# Patient Record
Sex: FEMALE | Race: WHITE | Employment: FULL TIME | URBAN - METROPOLITAN AREA
[De-identification: names, ages, dates, MRNs, and addresses within clinical notes are randomized per-mention and may not be internally consistent; named-entity substitution may affect disease eponyms.]

---

## 2017-02-04 DIAGNOSIS — E04.2 MULTINODULAR NON-TOXIC GOITER: Primary | ICD-10-CM

## 2017-02-13 ENCOUNTER — HOSPITAL ENCOUNTER (OUTPATIENT)
Dept: ULTRASOUND IMAGING | Age: 42
Discharge: HOME | End: 2017-02-13
Attending: INTERNAL MEDICINE
Payer: COMMERCIAL

## 2017-02-13 DIAGNOSIS — E04.2 MULTINODULAR NON-TOXIC GOITER: ICD-10-CM

## 2017-02-13 PROCEDURE — 76536 US EXAM OF HEAD AND NECK: CPT

## 2017-03-10 ENCOUNTER — OFFICE VISIT (OUTPATIENT)
Dept: ENDOCRINOLOGY | Age: 42
End: 2017-03-10

## 2017-03-10 VITALS
WEIGHT: 128.2 LBS | DIASTOLIC BLOOD PRESSURE: 63 MMHG | SYSTOLIC BLOOD PRESSURE: 113 MMHG | HEART RATE: 71 BPM | BODY MASS INDEX: 23.59 KG/M2 | HEIGHT: 62 IN

## 2017-03-10 DIAGNOSIS — R53.82 CHRONIC FATIGUE: ICD-10-CM

## 2017-03-10 DIAGNOSIS — E04.2 MULTINODULAR NON-TOXIC GOITER: Primary | ICD-10-CM

## 2017-03-10 RX ORDER — BUDESONIDE 0.25 MG/2ML
INHALANT ORAL
COMMUNITY
End: 2018-03-01

## 2017-03-10 RX ORDER — DROSPIRENONE AND ETHINYL ESTRADIOL 0.02-3(28)
KIT ORAL DAILY
COMMUNITY

## 2017-03-10 RX ORDER — SERTRALINE HYDROCHLORIDE 50 MG/1
TABLET, FILM COATED ORAL DAILY
COMMUNITY
End: 2018-03-01

## 2017-03-10 NOTE — MR AVS SNAPSHOT
Visit Information Date & Time Provider Department Dept. Phone Encounter #  
 3/10/2017  8:30 AM Ramona Cobian, [address] [place] [address] Diabetes and Endocrinology [phone] [id number] Follow-up Instructions Return in about 1 year (around 3/10/2018). Upcoming Health Maintenance Date Due DTaP/Tdap/Td series (1 - Tdap) 2/2/1995 PAP AKA CERVICAL CYTOLOGY 2/2/1995 INFLUENZA AGE 9 TO ADULT 8/1/2016 Allergies as of 3/10/2017  Review Complete On: 3/10/2017 By: Ramona Cobian MD  
  
 Severity Noted Reaction Type Reactions Flagyl [Metronidazole]  03/04/2016    Other (comments) Macrobid [Nitrofurantoin Monohyd/m-cryst]  11/15/2012    Rash Pcn [Penicillins]  11/15/2012    Rash  
 Sulfa Dyne  11/15/2012    Rash Current Immunizations  Never Reviewed Name Date Influenza Vaccine Split 11/15/2012 Not reviewed this visit You Were Diagnosed With   
  
 Codes Comments Multinodular non-toxic goiter    -  Primary ICD-10-CM: E04.2 ICD-9-CM: 246. 1 Vitals BP Pulse Height(growth percentile) Weight(growth percentile) BMI OB Status 113/63 (BP 1 Location: Left arm, BP Patient Position: Sitting) 71 5' 2\" (1.575 m) 128 lb 3.2 oz (58.2 kg) 23.45 kg/m2 Having regular periods Smoking Status Never Smoker Vitals History BMI and BSA Data Body Mass Index Body Surface Area  
 23.45 kg/m 2 1.6 m 2 Preferred Pharmacy Pharmacy Name Phone [facility] - [address] [place] [address] [phone] Your Updated Medication List  
  
   
This list is accurate as of: 3/10/17  9:05 AM.  Always use your most recent med list.  
  
  
  
  
 budesonide 0.25 mg/2 mL Nbsp Commonly known as:  PULMICORT Uses as needed with the kareen pot  
  
 multivitamin tablet Commonly known as:  ONE A DAY Take 1 Tab by mouth daily.   
  
 PROBIOTIC PO  
 Take  by mouth. Enzymatic therapy pearls    Indications: VAGINAL IRRITATION  
  
 sertraline 50 mg tablet Commonly known as:  ZOLOFT Take  by mouth daily. JARET (28) 3-0.02 mg Tab Generic drug:  drospirenone-ethinyl estradiol Take  by mouth daily. We Performed the Following [place] COLLECTION VENOUS BLOOD,VENIPUNCTURE F6650970 CPT(R)] [place] HANDLG&/OR CONVEY OF SPEC FOR TR OFFICE TO LAB [17396 CPT(R)] TSH 3RD GENERATION [66798 CPT(R)] Follow-up Instructions Return in about 1 year (around 3/10/2018). Patient Instructions 1) Your thyroid ultrasound showed your nodules were stable to slightly smaller than one year ago. At this point, we won't need to continue following with ultrasound unless you develop any of the symptoms below:  
- difficulty swallowing 
- hoarseness 
- difficulty breathing, especially when lying down 2) I will repeat your TSH (thyroid test) and send you a message either via [employer] if you sign up or via letter. 3) I will mail you a lab slip about 3-4 weeks before your next visit to have your labs repeated 3-4 days prior to your next visit. 4) You can try Dr. Jazmine Gutierrez or Dr. Rea Calixto or Dr. Laura Jeffery or Dr. Fide Ribera at [facility] for primary care. [phone]. They are in Medical office building IV ([facility] building) on the 3rd floor across from the ER. Introducing [address]! [facility] introduces [email or web address] patient portal. Now you can access parts of your medical record, email your doctor's office, and request medication refills online. 1. In your internet browser, go to [email or web address] 2. Click on the First Time User? Click Here link in the Sign In box. You will see the New Member Sign Up page. 3. Enter your [email or web address] Access Code exactly as it appears below. You will not need to use this code after youve completed the sign-up process.  If you do not sign up before the expiration date, you must request a new code. · "[employer] Access Code: [email or web address] Expires: 5/7/2017  2:38 PM 
 
4. Enter the last four digits of your Social Security Number (xxxx) and Date of Birth (mm/dd/yyyy) as indicated and click Submit. You will be taken to the next sign-up page. 5. Create a "[employer] ID. This will be your "[employer] login ID and cannot be changed, so think of one that is secure and easy to remember. 6. Create a "[employer] password. You can change your password at any time. 7. Enter your Password Reset Question and Answer. This can be used at a later time if you forget your password. 8. Enter your e-mail address. You will receive e-mail notification when new information is available in [address]. 9. Click Sign Up. You can now view and download portions of your medical record. 10. Click the Download Summary menu link to download a portable copy of your medical information. If you have questions, please visit the Frequently Asked Questions section of the "[employer] website. Remember, "[employer] is NOT to be used for urgent needs. For medical emergencies, dial 911. Now available from your iPhone and Android! Please provide this summary of care documentation to your next provider. Your primary care clinician is listed as Karlie Woods. If you have any questions after today's visit, please call [phone].

## 2017-03-10 NOTE — PROGRESS NOTES
Chief Complaint   Patient presents with    Thyroid Problem     pcp and pharmacy confirmed     History of Present Illness: Shira Jiang is a 36 y.o. female here for follow up of thyroid. Weight up 9 lbs since last visit in 3/16. No dysphagia, dyspnea when supine, or hoarseness. Hasn't noticed any change in the size of her neck. Sometimes notices that the nodules are there and rarely will ache but nothing persistent. Bowels are regular. Periods are regular. Tends to stay cold but unchanged. No hair loss or brittle nails. Does tend to be tired but unchanged. Wonders if there is anything else that could be the cause of her fatigue aside from stress as a CPA and not always getting enough sleep though she tries to get at least 7 hours a night. Current Outpatient Prescriptions   Medication Sig    drospirenone-ethinyl estradiol (JARET, 28,) 3-0.02 mg tab Take  by mouth daily.  sertraline (ZOLOFT) 50 mg tablet Take  by mouth daily.  budesonide (PULMICORT) 0.25 mg/2 mL nbs Uses as needed with the kareen pot    LACTOBACILLUS RHAMNOSUS GG (PROBIOTIC PO) Take  by mouth. Enzymatic therapy pearls      Indications: VAGINAL IRRITATION    multivitamin (ONE A DAY) tablet Take 1 Tab by mouth daily. No current facility-administered medications for this visit. Allergies   Allergen Reactions    Flagyl [Metronidazole] Other (comments)    Macrobid [Nitrofurantoin Monohyd/M-Cryst] Rash    Pcn [Penicillins] Rash    Sulfa Dyne Rash     Review of Systems:  - Cardiovascular: no chest pain  - Neurological: no tremors  - Integumentary: skin is normal    Physical Examination:  Blood pressure 113/63, pulse 71, height 5' 2\" (1.575 m), weight 128 lb 3.2 oz (58.2 kg).   - General: pleasant, no distress, good eye contact   - Neck: (+) nodular goiter   - Cardiovascular: regular, normal rate, nl s1 and s2, no m/r/g   - Respiratory: clear to auscultation bilaterally   - Integumentary: skin is normal, no edema  - Neurological: reflexes 2+ at biceps, no tremors  - Psychiatric: normal mood and affect    Data Reviewed:   EXAM: US THYROID/PARATHYROID/SOFT TISS      INDICATION: Follow-up thyroid nodules.     COMPARISON: Ultrasound 1/8/2016.     TECHNIQUE: Real-time sonography of the thyroid gland was performed with a high  frequency linear transducer. Multiple static images were obtained.     FINDINGS:  The thyroid again is heterogeneous with multiple nodules but unchanged in size      The right lobe measures 4.7 x 1.6 x 1.4 cm, previously 5.1 x 1.5 x 1.3 cm and  the left lobe measures 4.1 x 1.2 x 1.0 cm, previously 4.5 x 1.1 x 1.2 cm. The  isthmus is stable and again measures 2 mm. The largest right-sided nodule  measures 2.7 x 1.0 x 0.8 cm, previously 2.8 x 1.1 x 1.1 cm. The largest  left-sided nodule measures 1.0 x 0.8 x 0.4 cm, previously 1.1 x 0.5 x 0.9 cm.     IMPRESSION  IMPRESSION: No significant change in thyroid nodules or overall thyroid size. Assessment/Plan:     1. Multinodular non-toxic goiter: In Nov 2014, she first noticed that the right side of her neck seemed more prominent and was more aware of a pressure in her neck. Did have occ dysphagia especially when at the gym. No dyspnea when supine or hoarseness. No exposure to neck radiation. Saw Dr. Gin Michel and had a neck ultrasound and then a CT scan and was referred to Dr. Marv Shahid who did biopsies of her 3.6 cm right mid nodule and 1.2 cm left lower lobe nodule and both came back benign. Just saw him for f/u in 2/16 and her TSH was normal at 0.72. Had repeat ultrasound in 2/16 that showed the nodule on the right side was actually smaller at 2.8 cm and in 2/17 her nodules are unchanged. At this point, given she is not having any compressive symptoms, I think it's fine to just monitor the nodules over time on exam and only repeat an ultrasound if she has new symptoms. I will follow her TSH to make sure she remains euthyroid.     - repeat thyroid ultrasound as needed in the future  - check TSH today and prior to next visit      2. Chronic fatigue:   - check cbc w/o diff, cmp, and vitamin D 25-OH level today      Patient Instructions   1) Your thyroid ultrasound showed your nodules were stable to slightly smaller than one year ago. At this point, we won't need to continue following with ultrasound unless you develop any of the symptoms below:   - difficulty swallowing  - hoarseness  - difficulty breathing, especially when lying down    2) I will repeat your TSH (thyroid test) and send you a message either via [employer] if you sign up or via letter. 3) I will mail you a lab slip about 3-4 weeks before your next visit to have your labs repeated 3-4 days prior to your next visit. 4) You can try Dr. J Luis Manuel or Dr. Goyo Liriano or Dr. Lara Carlos or Dr. Praneeth Solis at [facility] for primary care. [phone]. They are in Medical office building IV ([facility] building) on the 3rd floor across from the ER. Follow-up Disposition:  Return in about 1 year (around 3/10/2018). Copy sent to:  Dr. Neelima Nelson via [facility]  Dr. Rebecca Kirk    Lab follow up: 3/11/17    Sent her the following message in a letter:    Resulted Orders   TSH 3RD GENERATION   Result Value Ref Range    TSH 0.802 0.450 - 4.500 uIU/mL    Narrative    Performed at:  [address]  [id number]  : Wenceslao Farmer MD, Phone:  [phone]   VITAMIN D, 25 HYDROXY   Result Value Ref Range    VITAMIN D, 25-HYDROXY 42.6 30.0 - 100.0 ng/mL      Comment:      Vitamin D deficiency has been defined by the [facility] practice guideline as a  level of serum 25-OH vitamin D less than 20 ng/mL (1,2). The Endocrine Society went on to further define vitamin D  insufficiency as a level between 21 and 29 ng/mL (2). 1. IOM ([place] of Medicine). 2010.  Dietary reference     intakes for calcium and D. [facility]: The     [employer]. 2. Vijaya MF, Zac NC, Alberto CARRINGTON, et al.     Evaluation, treatment, and prevention of vitamin D     deficiency: an Endocrine Society clinical practice     guideline. JCEM. 2011 Jul; 96():1911-30. Narrative    Performed at:  [address]  [id number]  : Felicia Alegria MD, Phone:  [phone]   METABOLIC PANEL, COMPREHENSIVE   Result Value Ref Range    Glucose 71 65 - 99 mg/dL    BUN 12 6 - 24 mg/dL    Creatinine 0.79 0.57 - 1.00 mg/dL    GFR est non-AA 92 >59 mL/min/1.73    GFR est  >59 mL/min/1.73    BUN/Creatinine ratio 15 9 - 23    Sodium 141 134 - 144 mmol/L    Potassium 4.5 3.5 - 5.2 mmol/L    Chloride 102 96 - 106 mmol/L    CO2 22 18 - 29 mmol/L    Calcium 9.6 8.7 - 10.2 mg/dL    Protein, total 6.9 6.0 - 8.5 g/dL    Albumin 4.5 3.5 - 5.5 g/dL    GLOBULIN, TOTAL 2.4 1.5 - 4.5 g/dL    A-G Ratio 1.9 1.1 - 2.5      Comment:      **Effective March 13, 2017 the reference interval**    for A/G Ratio will be changing to: Age                Male          Female            0 -  7 days       1.1 - 2.3       1.1 - 2.3            8 - 30 days       1.2 - 2.8       1.2 - 2.8            1 -  6 months     1.3 - 3.6       1.3 - 3.6     7 months -  5 years      1.5 - 2.6       1.5 - 2.6               > 5 years      1.2 - 2.2       1.2 - 2.2      Bilirubin, total <0.2 0.0 - 1.2 mg/dL    Alk.  phosphatase 68 39 - 117 IU/L    AST (SGOT) 15 0 - 40 IU/L    ALT (SGPT) 12 0 - 32 IU/L    Narrative    Performed at:  [address]  [id number]  : Felicia Alegria MD, Phone:  [phone]   CBC W/O DIFF   Result Value Ref Range    WBC 4.6 3.4 - 10.8 x10E3/uL    RBC 4.01 3.77 - 5.28 x10E6/uL    HGB 13.1 11.1 - 15.9 g/dL    HCT 37.8 34.0 - 46.6 %    MCV 94 79 - 97 fL    MCH 32.7 26.6 - 33.0 pg    MCHC 34.7 31.5 - 35.7 g/dL    RDW 12.1 (L) 12.3 - 15.4 %    PLATELET 145 744 - 475 x10E3/uL    Narrative    Performed at:  [address]  [id number]  : Igor Qureshi MD, Phone:  [phone]       I wanted to update you on your recent lab results:    TSH is a thyroid test.  Your level is normal so you don't have any problem with your thyroid function at this time that requires any medication.  -------------------------------------------------------------------------------------------------------------------  Your CBC (complete blood count) was normal so you have no evidence of anemia.  -------------------------------------------------------------------------------------------------------------------  BUN and creatinine are markers of kidney function. Your values are normal.  -------------------------------------------------------------------------------------------------------------------  ALT and AST are markers of liver function. Your values are normal.  -------------------------------------------------------------------------------------------------------------------  Your electrolytes (sodium, potassium, and calcium) are all normal.  Your glucose (blood sugar) is normal.  -------------------------------------------------------------------------------------------------------------------  Your vitamin D level is 42.6 which is normal.  Goal is over 30.    -------------------------------------------------------------------------------------------------------------------  I didn't find any other reason for your fatigue at this time and it may just be from stress. Try to aim for 30 minutes of aerobic activity 5 days a week and aim to get 7-8 hours of sleep every night to see if this helps.

## 2017-03-10 NOTE — PATIENT INSTRUCTIONS
1) Your thyroid ultrasound showed your nodules were stable to slightly smaller than one year ago. At this point, we won't need to continue following with ultrasound unless you develop any of the symptoms below:   - difficulty swallowing  - hoarseness  - difficulty breathing, especially when lying down    2) I will repeat your TSH (thyroid test) and send you a message either via [employer] if you sign up or via letter. 3) I will mail you a lab slip about 3-4 weeks before your next visit to have your labs repeated 3-4 days prior to your next visit. 4) You can try Dr. Ray Bell or Dr. Chito Joshi or Dr. Gail Villeda or Dr. Emiile Salcido at [facility] for primary care. [phone]. They are in Medical office building IV ([facility] building) on the 3rd floor across from the ER.

## 2017-03-11 LAB
25(OH)D3+25(OH)D2 SERPL-MCNC: 42.6 NG/ML (ref 30–100)
ALBUMIN SERPL-MCNC: 4.5 G/DL (ref 3.5–5.5)
ALBUMIN/GLOB SERPL: 1.9 {RATIO} (ref 1.1–2.5)
ALP SERPL-CCNC: 68 IU/L (ref 39–117)
ALT SERPL-CCNC: 12 IU/L (ref 0–32)
AST SERPL-CCNC: 15 IU/L (ref 0–40)
BILIRUB SERPL-MCNC: <0.2 MG/DL (ref 0–1.2)
BUN SERPL-MCNC: 12 MG/DL (ref 6–24)
BUN/CREAT SERPL: 15 (ref 9–23)
CALCIUM SERPL-MCNC: 9.6 MG/DL (ref 8.7–10.2)
CHLORIDE SERPL-SCNC: 102 MMOL/L (ref 96–106)
CO2 SERPL-SCNC: 22 MMOL/L (ref 18–29)
CREAT SERPL-MCNC: 0.79 MG/DL (ref 0.57–1)
ERYTHROCYTE [DISTWIDTH] IN BLOOD BY AUTOMATED COUNT: 12.1 % (ref 12.3–15.4)
GLOBULIN SER CALC-MCNC: 2.4 G/DL (ref 1.5–4.5)
GLUCOSE SERPL-MCNC: 71 MG/DL (ref 65–99)
HCT VFR BLD AUTO: 37.8 % (ref 34–46.6)
HGB BLD-MCNC: 13.1 G/DL (ref 11.1–15.9)
MCH RBC QN AUTO: 32.7 PG (ref 26.6–33)
MCHC RBC AUTO-ENTMCNC: 34.7 G/DL (ref 31.5–35.7)
MCV RBC AUTO: 94 FL (ref 79–97)
PLATELET # BLD AUTO: 276 X10E3/UL (ref 150–379)
POTASSIUM SERPL-SCNC: 4.5 MMOL/L (ref 3.5–5.2)
PROT SERPL-MCNC: 6.9 G/DL (ref 6–8.5)
RBC # BLD AUTO: 4.01 X10E6/UL (ref 3.77–5.28)
SODIUM SERPL-SCNC: 141 MMOL/L (ref 134–144)
TSH SERPL DL<=0.005 MIU/L-ACNC: 0.8 UIU/ML (ref 0.45–4.5)
WBC # BLD AUTO: 4.6 X10E3/UL (ref 3.4–10.8)

## 2018-02-14 ENCOUNTER — TELEPHONE (OUTPATIENT)
Dept: ENDOCRINOLOGY | Age: 43
End: 2018-02-14

## 2018-02-14 DIAGNOSIS — E04.9 GOITER: Primary | ICD-10-CM

## 2018-02-14 NOTE — TELEPHONE ENCOUNTER
----- Message from Frank Babcock MD sent at 2/14/2018  9:26 AM EST -----      ----- Message -----     From: Frank Babcock MD     Sent: 2/14/2018       To: Frank Babcock MD    Mail her a lab slip    ----    completed

## 2018-02-24 LAB — TSH SERPL DL<=0.005 MIU/L-ACNC: 0.83 UIU/ML (ref 0.45–4.5)

## 2018-03-01 ENCOUNTER — OFFICE VISIT (OUTPATIENT)
Dept: ENDOCRINOLOGY | Age: 43
End: 2018-03-01

## 2018-03-01 VITALS
WEIGHT: 127.2 LBS | HEART RATE: 70 BPM | SYSTOLIC BLOOD PRESSURE: 107 MMHG | DIASTOLIC BLOOD PRESSURE: 61 MMHG | HEIGHT: 62 IN | BODY MASS INDEX: 23.41 KG/M2

## 2018-03-01 DIAGNOSIS — E04.2 MULTINODULAR NON-TOXIC GOITER: Primary | ICD-10-CM

## 2018-03-01 NOTE — PATIENT INSTRUCTIONS
1) Your TSH (thyroid test) remains normal so you don't have any need for medication. 2) Watch for the following symptoms and if any develop, please contact me for another appointment:  - difficulty swallowing  - hoarseness  - difficulty breathing, especially when lying down    3) Have your PCP check your TSH once a year and as long as this is normal, there is no need to see me. If it starts going low, then this is concerning for development of hyperthyroidism (fast metabolism). Please watch out for any symptoms of hyperthyroidism that would suggest you need to have labs drawn sooner such as chest pain, heart racing, anxiety, tremors, trouble sleeping, feeling hot all the time, losing weight with out trying, hair loss, or diarrhea. 4) You can try Dr. Bolivar Cardoza or Dr. Kayley Bolanos or Dr. Damon Mcburney at [facility] for primary care. [phone]. They are in Medical office building IV ([facility] on the 3rd floor across from the ER.

## 2018-03-01 NOTE — MR AVS SNAPSHOT
[address] [facility] [address] [address] [phone] Patient: Wandy Carmichael MRN: [id number] CAQ:[birth year] Visit Information Date & Time Provider Department Dept. Phone Encounter #  
 3/1/2018  8:50 AM Sadi Brady MD [place] [facility] [phone] [id number] Follow-up Instructions Return if symptoms worsen or fail to improve. Upcoming Health Maintenance Date Due DTaP/Tdap/Td series (1 - Tdap) 2/2/1995 PAP AKA CERVICAL CYTOLOGY 2/2/1995 Influenza Age 5 to Adult 8/1/2017 Allergies as of 3/1/2018  Review Complete On: 3/1/2018 By: Sadi Brady MD  
  
 Severity Noted Reaction Type Reactions Flagyl [Metronidazole]  03/04/2016    Other (comments) Macrobid [Nitrofurantoin Monohyd/m-cryst]  11/15/2012    Rash Pcn [Penicillins]  11/15/2012    Rash  
 Sulfa Dyne  11/15/2012    Rash Current Immunizations  Never Reviewed Name Date Influenza Vaccine Split 11/15/2012 Not reviewed this visit You Were Diagnosed With   
  
 Codes Comments Multinodular non-toxic goiter    -  Primary ICD-10-CM: E04.2 ICD-9-CM: 725. 1 Vitals BP Pulse Height(growth percentile) Weight(growth percentile) BMI OB Status 107/61 70 5' 2\" (1.575 m) 127 lb 3.2 oz (57.7 kg) 23.27 kg/m2 Having regular periods Smoking Status Never Smoker Vitals History BMI and BSA Data Body Mass Index Body Surface Area  
 23.27 kg/m 2 1.59 m 2 Preferred Pharmacy Pharmacy Name Phone [facility] [address] [facility] - [address] [facility], [place] [address] [phone] Your Updated Medication List  
  
   
This list is accurate as of 3/1/18  9:38 AM.  Always use your most recent med list.  
  
  
  
  
 multivitamin tablet Commonly known as:  ONE A DAY Take 1 Tab by mouth daily.   
  
 PROBIOTIC PO  
 Take  by mouth. Enzymatic therapy pearls    Indications: VAGINAL IRRITATION  
  
 JARET (28) 3-0.02 mg Tab Generic drug:  drospirenone-ethinyl estradiol Take  by mouth daily. Follow-up Instructions Return if symptoms worsen or fail to improve. Patient Instructions 1) Your TSH (thyroid test) remains normal so you don't have any need for medication. 2) Watch for the following symptoms and if any develop, please contact me for another appointment: 
- difficulty swallowing 
- hoarseness 
- difficulty breathing, especially when lying down 3) Have your PCP check your TSH once a year and as long as this is normal, there is no need to see me. If it starts going low, then this is concerning for development of hyperthyroidism (fast metabolism). Please watch out for any symptoms of hyperthyroidism that would suggest you need to have labs drawn sooner such as chest pain, heart racing, anxiety, tremors, trouble sleeping, feeling hot all the time, losing weight with out trying, hair loss, or diarrhea. 4) You can try Dr. Maru Salmeron or Dr. Leonel Weber or Dr. Winter Avila at [facility] for primary care. [phone]. They are in Medical office building IV ([facility] building) on the 3rd floor across from the ER. Introducing [facility]! Dear Amanda Reid: Thank you for requesting a [employer] account. Our records indicate that you already have an active [employer] account. You can access your account anytime at [email or web address] Did you know that you can access your hospital and ER discharge instructions at any time in [employer]? You can also review all of your test results from your hospital stay or ER visit. Additional Information If you have questions, please visit the Frequently Asked Questions section of the [employer] website at [email or web address] Remember, MyChart is NOT to be used for urgent needs. For medical emergencies, dial 911. Now available from your iPhone and Android! Please provide this summary of care documentation to your next provider. Your primary care clinician is listed as Melissa Gonzales. If you have any questions after today's visit, please call [phone].

## 2018-03-01 NOTE — PROGRESS NOTES
Chief Complaint   Patient presents with    Thyroid Problem     pcp and pharmacy confirmed     History of Present Illness: Aleyda Eid is a 39 y.o. female here for follow up of thyroid. Weight down 1 lbs since last visit in 3/17. No change to her health over the past year. No dysphagia, dyspnea when supine, or hoarseness. Hasn't noticed any change in the size of her neck. No chest pain or palpitations aside from when under stress. Going through divorce mediation yesterday and this process has been going on for 2 years. Willing to just f/u with her PCP going forward for this condition. Current Outpatient Prescriptions   Medication Sig    drospirenone-ethinyl estradiol (JARET, 28,) 3-0.02 mg tab Take  by mouth daily.  LACTOBACILLUS RHAMNOSUS GG (PROBIOTIC PO) Take  by mouth. Enzymatic therapy pearls      Indications: VAGINAL IRRITATION    multivitamin (ONE A DAY) tablet Take 1 Tab by mouth daily. No current facility-administered medications for this visit. Allergies   Allergen Reactions    Flagyl [Metronidazole] Other (comments)    Macrobid [Nitrofurantoin Monohyd/M-Cryst] Rash    Pcn [Penicillins] Rash    Sulfa Dyne Rash     Review of Systems:  - Cardiovascular: no chest pain  - Neurological: no tremors  - Integumentary: skin is normal    Physical Examination:  Blood pressure 107/61, pulse 70, height 5' 2\" (1.575 m), weight 127 lb 3.2 oz (57.7 kg). - General: pleasant, no distress, good eye contact   - Neck: small goiter but difficult to feel any nodules today  - Cardiovascular: regular, normal rate, nl s1 and s2, no m/r/g   - Respiratory: clear to auscultation bilaterally   - Integumentary: skin is normal, no edema  - Neurological: reflexes 2+ at biceps, no tremors  - Psychiatric: normal mood and affect    Data Reviewed:   Component      Latest Ref Rng & Units 2/23/2018          12:50 PM   TSH      0.450 - 4.500 uIU/mL 0.827       Assessment/Plan:     1.  Multinodular non-toxic goiter: In Nov 2014, she first noticed that the right side of her neck seemed more prominent and was more aware of a pressure in her neck. Did have occ dysphagia especially when at the gym. No dyspnea when supine or hoarseness. No exposure to neck radiation. Saw Dr. Yadira Collier and had a neck ultrasound and then a CT scan and was referred to Dr. Linda Cortez who did biopsies of her 3.6 cm right mid nodule and 1.2 cm left lower lobe nodule and both came back benign. Saw him for f/u in 2/16 and her TSH was normal at 0.72. Had repeat ultrasound in 2/16 that showed the nodule on the right side was actually smaller at 2.8 cm and in 2/17 her nodules were unchanged. TSH 0.80 in 2/17 and 0.82 in 2/18. At this point, given she is not having any compressive symptoms, I think it's fine to just monitor the nodules over time on exam and only repeat an ultrasound if she has new symptoms. She can just f/u with her PCP going forward to check her TSH annually to make sure she remains euthyroid. - repeat thyroid ultrasound as needed in the future  - check TSH annually            Patient Instructions   1) Your TSH (thyroid test) remains normal so you don't have any need for medication. 2) Watch for the following symptoms and if any develop, please contact me for another appointment:  - difficulty swallowing  - hoarseness  - difficulty breathing, especially when lying down    3) Have your PCP check your TSH once a year and as long as this is normal, there is no need to see me. If it starts going low, then this is concerning for development of hyperthyroidism (fast metabolism). Please watch out for any symptoms of hyperthyroidism that would suggest you need to have labs drawn sooner such as chest pain, heart racing, anxiety, tremors, trouble sleeping, feeling hot all the time, losing weight with out trying, hair loss, or diarrhea.     4) You can try Dr. Qing Giordano or Dr. Lynn Damon or Dr. Gregg Medina at [facility] for primary care. [phone]. They are in Medical office building [facility] ([facility] building) on the 3rd floor across from the ER. Follow-up Disposition:  Return if symptoms worsen or fail to improve.     Copy sent to:  Dr. Shade Becerra

## 2021-11-22 ENCOUNTER — OFFICE VISIT (OUTPATIENT)
Dept: SURGERY | Age: 46
End: 2021-11-22
Payer: COMMERCIAL

## 2021-11-22 VITALS
WEIGHT: 131.5 LBS | DIASTOLIC BLOOD PRESSURE: 60 MMHG | HEIGHT: 62 IN | BODY MASS INDEX: 24.2 KG/M2 | SYSTOLIC BLOOD PRESSURE: 110 MMHG | TEMPERATURE: 97.5 F | HEART RATE: 84 BPM | OXYGEN SATURATION: 99 % | RESPIRATION RATE: 16 BRPM

## 2021-11-22 DIAGNOSIS — R22.31 ARM MASS, RIGHT: Primary | ICD-10-CM

## 2021-11-22 PROCEDURE — 99203 OFFICE O/P NEW LOW 30 MIN: CPT | Performed by: SURGERY

## 2021-11-22 RX ORDER — LANOLIN ALCOHOL/MO/W.PET/CERES
3000 CREAM (GRAM) TOPICAL EVERY OTHER DAY
COMMUNITY

## 2021-11-22 RX ORDER — CHOLECALCIFEROL (VITAMIN D3) 125 MCG
1 CAPSULE ORAL DAILY
COMMUNITY

## 2021-11-22 NOTE — PROGRESS NOTES
HISTORY OF PRESENT ILLNESS  John Sommer is a 51 y.o. female who comes in for consultation by Lady Martina NP for a right arm mass  HPI  She noted a right forearm mass 3-4 weeks ago. It was painful initially. Now it is sore at times. She remembers waking up from sleeping with her left elbow pushing into the area (she reports being a restless sleeper). She denies having any bruising in the area initially. She denies other trauma, skin changes, pores, ulceration. She denies other similar issues, although her mother has lipomas. Past Medical History:   Diagnosis Date    Calculus of kidney     s/p lithotripsy x2    Multinodular non-toxic goiter     Seasonal allergic rhinitis     Vasomotor rhinitis      Past Surgical History:   Procedure Laterality Date    HX LITHOTRIPSY      x2    HX ORTHOPAEDIC Left 1987    knee scope     Family History   Problem Relation Age of Onset    Diabetes Mother     Depression Mother     Thyroid Disease Mother         hypothyroidism    Heart Disease Mother         stents    Hypertension Father     Heart Disease Father         stent    Stroke Maternal Grandmother     Lung Disease Maternal Grandfather     Cancer Paternal Grandmother     Diabetes Paternal Grandmother     Thyroid Disease Paternal Grandmother         goiter    Heart Disease Paternal Grandfather      Social History     Tobacco Use    Smoking status: Never Smoker    Smokeless tobacco: Never Used   Vaping Use    Vaping Use: Never used   Substance Use Topics    Alcohol use: Yes     Alcohol/week: 0.0 standard drinks     Comment: rarely may have a cocktail    Drug use: No     Current Outpatient Medications   Medication Sig    cyanocobalamin 1,000 mcg tablet Take 3,000 mcg by mouth every other day.  cholecalciferol, vitamin D3, 50 mcg (2,000 unit) tab Take 1 Tablet by mouth daily.  drospirenone-ethinyl estradiol (JARET, Teodora,) 3-0.02 mg tab Take  by mouth daily.     LACTOBACILLUS RHAMNOSUS GG (PROBIOTIC PO) Take  by mouth. Enzymatic therapy pearls      Indications: VAGINAL IRRITATION    multivitamin (ONE A DAY) tablet Take 1 Tab by mouth daily. No current facility-administered medications for this visit. Allergies   Allergen Reactions    Flagyl [Metronidazole] Other (comments)    Macrobid [Nitrofurantoin Monohyd/M-Cryst] Rash    Pcn [Penicillins] Rash    Sulfa Dyne Rash       Review of Systems   Constitutional: Negative for chills, diaphoresis, fever and weight loss. HENT: Negative for sore throat. Eyes: Negative for blurred vision and discharge. Respiratory: Negative for cough, shortness of breath and wheezing. Cardiovascular: Negative for chest pain, palpitations, orthopnea, claudication and leg swelling. Gastrointestinal: Negative for abdominal pain, constipation, diarrhea, heartburn, melena, nausea and vomiting. Genitourinary: Negative for dysuria, flank pain, frequency and hematuria. Musculoskeletal: Negative for back pain, joint pain, myalgias and neck pain. Skin: Negative for rash. Neurological: Negative for dizziness, speech change, focal weakness, seizures, loss of consciousness, weakness and headaches. Endo/Heme/Allergies: Does not bruise/bleed easily. Psychiatric/Behavioral: Negative for depression and memory loss. Visit Vitals  /60 (BP 1 Location: Left upper arm, BP Patient Position: Sitting)   Pulse 84   Temp 97.5 °F (36.4 °C) (Temporal)   Resp 16   Ht 5' 2\" (1.575 m)   Wt 59.6 kg (131 lb 8 oz)   SpO2 99%   BMI 24.05 kg/m²       Physical Exam  Constitutional:       General: She is not in acute distress. Appearance: She is well-developed. She is not diaphoretic. HENT:      Head: Normocephalic and atraumatic. Mouth/Throat:      Pharynx: No oropharyngeal exudate. Eyes:      General: No scleral icterus. Conjunctiva/sclera: Conjunctivae normal.      Pupils: Pupils are equal, round, and reactive to light.    Neck:      Thyroid: Thyroid mass (3 cm fullness right lobe. no palpable left lobe nodule. area that she had felt appears to be the right carotid bulb) present. No thyromegaly. Vascular: No JVD. Trachea: Trachea and phonation normal. No tracheal deviation. Cardiovascular:      Rate and Rhythm: Normal rate and regular rhythm. Heart sounds: No murmur heard. No friction rub. No gallop. Pulmonary:      Effort: Pulmonary effort is normal. No respiratory distress. Breath sounds: Normal breath sounds. No wheezing or rales. Abdominal:      General: Bowel sounds are normal. There is no distension. Palpations: Abdomen is soft. There is no mass. Tenderness: There is no abdominal tenderness. There is no guarding or rebound. Musculoskeletal:         General: Normal range of motion. Cervical back: Full passive range of motion without pain, normal range of motion and neck supple. Comments: 6 mm soft smooth subcutaneous mass on volar aspect of right forearm mid shaft  No edema, erythema, ecchymosis, ulceration, pore       Lymphadenopathy:      Cervical: No cervical adenopathy. Right cervical: No superficial or deep cervical adenopathy. Left cervical: No superficial or deep cervical adenopathy. Skin:     General: Skin is warm and dry. Coloration: Skin is not pale. Findings: No erythema or rash. Neurological:      Mental Status: She is alert and oriented to person, place, and time. Cranial Nerves: No cranial nerve deficit. Psychiatric:         Behavior: Behavior normal.         Thought Content: Thought content normal.         Judgment: Judgment normal.         ASSESSMENT and PLAN  1. Right forearm subcutaneous mass. I suspect this is a lipoma or angiolipoma and less likely to be a cyst or malignancy. I explained to her about the anatomy and pathophysiology of the process and options for observation, US, or excision. Risks and benefits of each were discussed.     2. Goiter. Asymptomatic and does not appear any larger  3.   Received Covid-19 vaccination [employer])    She prefers observation   RTC 3 months    Vannesa Coles MD FACS

## 2021-11-22 NOTE — PROGRESS NOTES
Identified pt with two pt identifiers(name and ). Reviewed record in preparation for visit and have obtained necessary documentation. All patient medications has been reviewed. Chief Complaint   Patient presents with    Mass     seen at the request of ZEB Mcghee for evaluation of Rt forearm cyst       Health Maintenance Due   Topic    Hepatitis C Screening     COVID-19 Vaccine (1)    DTaP/Tdap/Td series (1 - Tdap)    Cervical cancer screen     Lipid Screen     Colorectal Cancer Screening Combo     Flu Vaccine (1)       Vitals:    21 0822   BP: 110/60   Pulse: 84   Resp: 16   Temp: 97.5 °F (36.4 °C)   TempSrc: Temporal   SpO2: 99%   Weight: 59.6 kg (131 lb 8 oz)   Height: 5' 2\" (1.575 m)   PainSc:   0 - No pain       4. Have you been to the ER, urgent care clinic since your last visit? Hospitalized since your last visit? No    5. Have you seen or consulted any other health care providers outside of the [address] [place] [address] since your last visit? Include any pap smears or colon screening. No      Patient is accompanied by self I have received verbal consent from Thee Pereira to discuss any/all medical information while they are present in the room.

## 2021-11-22 NOTE — LETTER
11/22/2021    Patient: Wali Collins   YOB: 1975   Date of Visit: 11/22/2021     Mariana Roque NP  [address]  [address]  [address] [address]  Via Fax: [phone]    Dear Mariana Roque NP,      Thank you for referring Ms. Alonso Graham to  [facility] for evaluation. My notes for this consultation are attached. If you have questions, please do not hesitate to call me. I look forward to following your patient along with you.       Sincerely,    Timo Beltran MD

## 2022-03-19 PROBLEM — R53.82 CHRONIC FATIGUE: Status: ACTIVE | Noted: 2017-03-10

## 2022-03-19 PROBLEM — R22.31 ARM MASS, RIGHT: Status: ACTIVE | Noted: 2021-11-22

## 2022-04-22 ENCOUNTER — OFFICE VISIT (OUTPATIENT)
Dept: SURGERY | Age: 47
End: 2022-04-22
Payer: COMMERCIAL

## 2022-04-22 VITALS
WEIGHT: 131.4 LBS | BODY MASS INDEX: 24.18 KG/M2 | TEMPERATURE: 97.5 F | HEART RATE: 77 BPM | RESPIRATION RATE: 16 BRPM | DIASTOLIC BLOOD PRESSURE: 60 MMHG | HEIGHT: 62 IN | SYSTOLIC BLOOD PRESSURE: 110 MMHG | OXYGEN SATURATION: 99 %

## 2022-04-22 DIAGNOSIS — R22.31 ARM MASS, RIGHT: ICD-10-CM

## 2022-04-22 DIAGNOSIS — E04.9 GOITER: Primary | ICD-10-CM

## 2022-04-22 PROCEDURE — 99214 OFFICE O/P EST MOD 30 MIN: CPT | Performed by: SURGERY

## 2022-04-22 RX ORDER — VITAMIN E 268 MG
400 CAPSULE ORAL DAILY
COMMUNITY

## 2022-04-22 NOTE — LETTER
4/22/2022    Patient: Cassie Browne   YOB: 1975   Date of Visit: 4/22/2022     Malik Yañez NP  [address]  [address]  [address] [address]  Via Fax: [phone]    Dear Malik Yañez NP,      Thank you for referring Ms. Susy Ribeiro to  [facility] for evaluation. My notes for this consultation are attached. If you have questions, please do not hesitate to call me. I look forward to following your patient along with you.       Sincerely,    Dakota Child MD

## 2022-04-22 NOTE — PROGRESS NOTES
HISTORY OF PRESENT ILLNESS  Janel Zabala is a 49 y.o. female who comes in for follow up by Jackie Gilliland NP for a right arm mass and also notes neck pressure  Follow-up  Pertinent negatives include no chest pain, no abdominal pain, no headaches and no shortness of breath. She noted a right forearm mass 10/2021. It was painful initially. Now it is sore at times. She remembers waking up from sleeping with her left elbow pushing into the area (she reports being a restless sleeper). She denies having any bruising in the area initially. She denies other trauma, skin changes, pores, ulceration. She denies other similar issues, although her mother has lipomas. She also has a hx goiter. Recently she feels neck pressure and tightness. She denies palpitations, dysphagia, voice changes, unexplained weight loss/gain, hot/cold intolerance. She does report some post nasal drip/congestion. She denies reflux. Past Medical History:   Diagnosis Date    Calculus of kidney     s/p lithotripsy x2    Multinodular non-toxic goiter     Seasonal allergic rhinitis     Vasomotor rhinitis      Past Surgical History:   Procedure Laterality Date    HX LITHOTRIPSY      x2    HX ORTHOPAEDIC Left 1987    knee scope     Family History   Problem Relation Age of Onset    Diabetes Mother     Depression Mother     Thyroid Disease Mother         hypothyroidism    Heart Disease Mother         stents    Hypertension Father     Heart Disease Father         stent    Stroke Maternal Grandmother     Lung Disease Maternal Grandfather     Cancer Paternal Grandmother     Diabetes Paternal Grandmother     Thyroid Disease Paternal Grandmother         goiter    Heart Disease Paternal Grandfather      Social History     Tobacco Use    Smoking status: Never Smoker    Smokeless tobacco: Never Used   Vaping Use    Vaping Use: Never used   Substance Use Topics    Alcohol use:  Yes     Alcohol/week: 0.0 standard drinks Comment: rarely may have a cocktail    Drug use: No     Current Outpatient Medications   Medication Sig    vitamin E (AQUA GEMS) 400 unit capsule Take 400 Units by mouth daily.  cyanocobalamin 1,000 mcg tablet Take 3,000 mcg by mouth every other day.  cholecalciferol, vitamin D3, 50 mcg (2,000 unit) tab Take 1 Tablet by mouth daily.  drospirenone-ethinyl estradiol (JARET, 28,) 3-0.02 mg tab Take  by mouth daily.  LACTOBACILLUS RHAMNOSUS GG (PROBIOTIC PO) Take  by mouth. Enzymatic therapy pearls      Indications: VAGINAL IRRITATION    multivitamin (ONE A DAY) tablet Take 1 Tab by mouth daily. No current facility-administered medications for this visit. Allergies   Allergen Reactions    Flagyl [Metronidazole] Other (comments)    Macrobid [Nitrofurantoin Monohyd/M-Cryst] Rash    Pcn [Penicillins] Rash    Sulfa Dyne Rash       Review of Systems   Constitutional: Negative for chills, diaphoresis, fever and weight loss. HENT: Negative for sore throat. Eyes: Negative for blurred vision and discharge. Respiratory: Negative for cough, shortness of breath and wheezing. Cardiovascular: Negative for chest pain, palpitations, orthopnea, claudication and leg swelling. Gastrointestinal: Negative for abdominal pain, constipation, diarrhea, heartburn, melena, nausea and vomiting. Genitourinary: Negative for dysuria, flank pain, frequency and hematuria. Musculoskeletal: Negative for back pain, joint pain, myalgias and neck pain. Skin: Negative for rash. Neurological: Negative for dizziness, speech change, focal weakness, seizures, loss of consciousness, weakness and headaches. Endo/Heme/Allergies: Does not bruise/bleed easily. Psychiatric/Behavioral: Negative for depression and memory loss.      Visit Vitals  /60 (BP 1 Location: Left upper arm, BP Patient Position: Sitting)   Pulse 77   Temp 97.5 °F (36.4 °C) (Temporal)   Resp 16   Ht 5' 2\" (1.575 m)   Wt 59.6 kg (131 lb 6.4 oz)   SpO2 99%   BMI 24.03 kg/m²       Physical Exam  Constitutional:       General: She is not in acute distress. Appearance: She is well-developed. She is not diaphoretic. HENT:      Head: Normocephalic and atraumatic. Mouth/Throat:      Pharynx: No oropharyngeal exudate. Eyes:      General: No scleral icterus. Conjunctiva/sclera: Conjunctivae normal.      Pupils: Pupils are equal, round, and reactive to light. Neck:      Thyroid: Thyroid mass (3 cm fullness right lobe. no palpable left lobe nodule. area that she had felt appears to be the right carotid bulb) present. No thyromegaly. Vascular: No JVD. Trachea: Trachea and phonation normal. No tracheal deviation. Cardiovascular:      Rate and Rhythm: Normal rate and regular rhythm. Heart sounds: No murmur heard. No friction rub. No gallop. Pulmonary:      Effort: Pulmonary effort is normal. No respiratory distress. Breath sounds: Normal breath sounds. No wheezing or rales. Abdominal:      General: Bowel sounds are normal. There is no distension. Palpations: Abdomen is soft. There is no mass. Tenderness: There is no abdominal tenderness. There is no guarding or rebound. Musculoskeletal:         General: Normal range of motion. Cervical back: Full passive range of motion without pain, normal range of motion and neck supple. Comments: 6 mm soft smooth subcutaneous mass on volar aspect of right forearm mid shaft  No edema, erythema, ecchymosis, ulceration, pore       Lymphadenopathy:      Cervical: No cervical adenopathy. Right cervical: No superficial or deep cervical adenopathy. Left cervical: No superficial or deep cervical adenopathy. Skin:     General: Skin is warm and dry. Coloration: Skin is not pale. Findings: No erythema or rash. Neurological:      Mental Status: She is alert and oriented to person, place, and time. Cranial Nerves: No cranial nerve deficit. Psychiatric:         Behavior: Behavior normal.         Thought Content: Thought content normal.         Judgment: Judgment normal.         ASSESSMENT and PLAN  1. Right forearm subcutaneous mass, unchanged barely noticeable. I suspect this is a lipoma or angiolipoma and less likely to be a cyst or malignancy. I explained to her about the anatomy and pathophysiology of the process and options for observation, US, or excision. Risks and benefits of each were discussed. 2.   Goiter. Slightly more symptomatic. Will get an US and TSH, free T4  3.   Received Covid-19 vaccination [employer])    Will call with US and lab results    RTC tbd    Hillary Mark MD FACS

## 2022-04-22 NOTE — PROGRESS NOTES
Identified pt with two pt identifiers(name and ). Reviewed record in preparation for visit and have obtained necessary documentation. All patient medications has been reviewed. Chief Complaint   Patient presents with    Follow-up     follow up Right forearm subcutaneous mass       Health Maintenance Due   Topic    Hepatitis C Screening     COVID-19 Vaccine (1)    DTaP/Tdap/Td series (1 - Tdap)    Cervical cancer screen     Lipid Screen     Colorectal Cancer Screening Combo        Vitals:    22 0808   BP: 110/60   Pulse: 77   Resp: 16   Temp: 97.5 °F (36.4 °C)   TempSrc: Temporal   SpO2: 99%   Weight: 59.6 kg (131 lb 6.4 oz)   Height: 5' 2\" (1.575 m)   PainSc:   0 - No pain       4. Have you been to the ER, urgent care clinic since your last visit? Hospitalized since your last visit? No    5. Have you seen or consulted any other health care providers outside of the [address] [place] [address] since your last visit? Include any pap smears or colon screening. No      Patient is accompanied by self I have received verbal consent from Enriqueta Valdes to discuss any/all medical information while they are present in the room.

## 2022-04-23 LAB
T4 FREE SERPL-MCNC: 1.12 NG/DL (ref 0.82–1.77)
TSH SERPL DL<=0.005 MIU/L-ACNC: 0.94 UIU/ML (ref 0.45–4.5)

## 2022-04-26 ENCOUNTER — HOSPITAL ENCOUNTER (OUTPATIENT)
Dept: ULTRASOUND IMAGING | Age: 47
Discharge: HOME | End: 2022-04-26
Attending: SURGERY
Payer: COMMERCIAL

## 2022-04-26 DIAGNOSIS — E04.9 GOITER: ICD-10-CM

## 2022-04-26 PROCEDURE — 76536 US EXAM OF HEAD AND NECK: CPT

## 2022-05-04 ENCOUNTER — PATIENT MESSAGE (OUTPATIENT)
Dept: SURGERY | Age: 47
End: 2022-05-04

## 2022-05-04 DIAGNOSIS — E04.2 MULTINODULAR GOITER: Primary | ICD-10-CM

## 2022-05-06 NOTE — TELEPHONE ENCOUNTER
TFTs are wnl    US  FINDINGS:  The thyroid contains multiple nodules with 2 nodules on the right and 1 on the  left. Largest right nodule measures 12 x 19 x 38 mm, 8 x 10 x 27 mm as measured  in 2017, however appears very similar to the site lesion which measured 11 x 13  x 36 cm on that exam. Left nodule measures 8 x 11 x 17 mm, 4 x 8 x 10 mm as  measured 2017 and 5 x 9 x 12 mm as measured 2015.     The right lobe measures 1.7 x 1.8 x 5.5 cm and the left lobe measures 1.3 x 1.4  x 4.9 cm. The isthmus measures 2 cm.     IMPRESSION  Bilateral thyroid nodules which appear similar across prior exams  with measurements slightly increasing but likely within range of error given  slight differences in technique and visualization.       Discussed options with patient for observation, FNA of the larger lesion and thyroidectomy    Symptoms are mild and she prefers observation at this time    Repeat US thyroid 1 year  RTC 1 year    Stefanie,  Please mail slip to patient     Henri Gallardo MD FACS

## 2022-07-08 ENCOUNTER — TELEPHONE (OUTPATIENT)
Dept: SURGERY | Age: 47
End: 2022-07-08

## 2022-07-08 NOTE — TELEPHONE ENCOUNTER
Regarding: Test Results  ----- Message from Vannesa Coles MD sent at 5/6/2022  1:50 PM EDT -----       ----- Message from Evan Wise to Parris Hodgkins, MD sent at 5/4/2022  3:41 PM -----   Will someone be contacting to explain the results of my lab work and ultrasound ordered by Dr. Bettye Guerrero?

## 2022-07-08 NOTE — TELEPHONE ENCOUNTER
Spoke with patient the US is already scheduled for next year and she said I did not need to send her the slip.

## 2022-12-19 ENCOUNTER — APPOINTMENT (OUTPATIENT)
Dept: PHYSICAL THERAPY | Age: 47
End: 2022-12-19

## 2022-12-21 ENCOUNTER — HOSPITAL ENCOUNTER (OUTPATIENT)
Dept: PHYSICAL THERAPY | Age: 47
Discharge: HOME | End: 2022-12-21
Payer: COMMERCIAL

## 2022-12-21 PROCEDURE — 97162 PT EVAL MOD COMPLEX 30 MIN: CPT | Performed by: PHYSICAL THERAPIST

## 2022-12-21 NOTE — THERAPY EVALUATION
Physical Therapy at [facility],   a part of  [facility]  [address]  Phone: [phone]  Fax: [phone]    Plan of Care/ Statement of Necessity for Physical Therapy Services 2-15    Patient name: Jessica Callahan  : 1975  Provider#: [id number]  Referral source: Renata Rodríguez MD      Medical/Treatment Diagnosis: Pelvic floor dysfunction [M62.89]  Constipation [K59.00]     Prior Hospitalization: see medical history     Comorbidities: see eval  Prior Level of Function: see eval  Medications: Verified on Patient Summary List    Start of Care: 21      Onset Date: chronic       The Plan of Care and following information is based on the information from the initial evaluation. Assessment/ key information: Patient referred to pelvic PT for pelvic floor dysfunction. She presents with signs and symptoms associated with constipation and urge incontinence. She presents with strength deficits, overactivity, poor coordination and awareness of the levator ani. She will benefit from skilled PT to address these problems so that she can perform all ADLs at [facility]. Evaluation Complexity History MEDIUM  Complexity : 1-2 comorbidities / personal factors will impact the outcome/ POC ; Examination MEDIUM Complexity : 3 Standardized tests and measures addressing body structure, function, activity limitation and / or participation in recreation  ;Presentation MEDIUM Complexity : Evolving with changing characteristics  ; Clinical Decision Making MEDIUM Complexity : FOTO score of 26-74  Overall Complexity Rating: MEDIUM    Problem List: pain affecting function, decrease ROM, decrease strength, impaired gait/ balance, decrease ADL/ functional abilitiies, decrease activity tolerance, and decrease flexibility/ joint mobility   Treatment Plan may include any combination of the following: Therapeutic exercise, Neuromuscular reeducation, Manual therapy, Therapeutic activity, Self care/home management, Electric stim unattended , Gait training, Ultrasound, Mechanical traction, and Electric stim attended  Patient / Family readiness to learn indicated by: asking questions, trying to perform skills, and interest  Persons(s) to be included in education: patient (P)  Barriers to Learning/Limitations: None  Patient Goal (s): to reduce constipation and pain  Patient Self Reported Health Status: good  Rehabilitation Potential: good    Short Term Goals: To be accomplished in 6 weeks:  Patient will be independent with a progressive home exercise program    Patient will demonstrate & utilized pelvic floor ms protection techniques   Patient will demonstrate compliance with squatty potty technique  Patient will demonstrate compliance with fiber  Patient will demonstrate improved PFM strength to 4/5 bilaterally in order to decrease FI symptoms      Long Term Goals: To be accomplished in 12 weeks:  Patient will demonstrate 5/5 PFM strength bilaterally in order to eliminate FI symptoms. Patient will demonstrate 10 second PFM holds at 5/5 in order to elminate FI symptoms. Patient will be able to maintain normal resting tone of 5mV for complete emptying of bowel     Frequency / Duration: Patient to be seen 1-2 times per week for 12 weeks. Patient/ Caregiver education and instruction: self care, activity modification, and exercises    [x]  Plan of care has been reviewed with MAEVE Peralta, PT 12/21/2022     ________________________________________________________________________    I certify that the above Therapy Services are being furnished while the patient is under my care. I agree with the treatment plan and certify that this therapy is necessary.     Physician's Signature:____________________  Date:____________Time: _________      Willow Farah MD

## 2022-12-21 NOTE — PROGRESS NOTES
PT INITIAL EVALUATION NOTE 2-15    Patient Name: Carmen Menjivar  Date:2022  : 1975  [x]  Patient  Verified  Payor: Pierre Benjamin / Plan: Jessica Zimmerman / Product Type: HMO /    In time:200  Out time:300  Total Treatment Time (min): 60  Visit #: 1     Treatment Area: Pelvic floor dysfunction [M62.89]  Constipation [K59.00]    SUBJECTIVE  Pain Level (0-10 scale): 0  Any medication changes, allergies to medications, adverse drug reactions, diagnosis change, or new procedure performed?: [] No    [x] Yes (see summary sheet for update)  Subjective:     Patient reports that she had 'megacolon' as kid, and treated with colace and mineral oil. Has had on/off chronic constipation. She reports that she has abdominal pain/bloat every time she eats. Patient takes probiotics, uses senna if constipation flares. Exercises with yoga regularly. Saw a pelvic PT in her early 19's for urological reason (unsure). Was told that she holds tension in her PFM. Had 2  in  and  with episiotomy. Does not report any urinary issues, but thinks that she goes often (depends on fluid intake). No problems with delaying urine. Prior to taking Linzess, she had BM every 3 days of type 1-2 stool. Linzess caused elevated blood sugar and did not see improvements. Now taking miralax and will go 1x/day or every 3 days. Had a colonoscopy 1 week ago, does not know results      PLOF: yoga  Mechanism of Injury: chronic  Previous Treatment/Compliance: see chart  PMHx/Surgical Hx: n/a  Work Hx: full time  Living Situation: single, lives with kids  Pt Goals: to reduce pain and constipation  Barriers: none  Motivation: yes  Substance use: none   Cognition: A & O x 4        OBJECTIVE/EXAMINATION  Patient instructed in the role of physical therapy in the evaluation and treatment of pelvic floor dysfunction, applicable treatment modalities discussed.  Expectations for regular home exercise participation and expected visit frequency in to achieve the patient's goals were discussed. Patient instructed in pelvic floor anatomy and function including levator ani, puborectalis and superficial pelvic  musculature. Patient was provided dietary information to improve stool quality including increasing soluble fiber intake (Bran Buds), probiotics and increased water intake (6-8 glasses a day). Pt instructed in use of [place] stool chart to track progress. Patient provided information on bladder diary completion, will collect intake/measured output data for 72 hours and return for analysis. Patient educated in urinary urge suppression techniques including the KNACK, quick flicks, calmly walking rather than rushing to the toilet, nervous system down training. Patient educated in proper defecation posture and technique including use of Squatty Potty for better puborectalis ms relaxation. Pt to avoid straining to pass stool in order to decrease strain on pelvic floor.                7 min Self Care/Home Management:  [x]  See flow sheet :abdominal massag3   Rationale: increase ROM, increase strength, improve coordination, and increase proprioception  to improve the patients ability to reduce pain      With   [] TE   [] TA   [] Neuro   [] SC   [] other: Patient Education: [x] Review HEP    [] Progressed/Changed HEP based on:   [] positioning   [] body mechanics   [] transfers   [] heat/ice application    [] other:        Other Objective/Functional Measures: FOTO Functional Measure: 30 PFDI    Pain Level (0-10 scale) post treatment: 0      ASSESSMENT:      [x]  See Plan of [facility], PT 12/21/2022

## 2023-01-20 ENCOUNTER — APPOINTMENT (OUTPATIENT)
Dept: PHYSICAL THERAPY | Age: 48
End: 2023-01-20
Payer: COMMERCIAL

## 2023-01-25 ENCOUNTER — HOSPITAL ENCOUNTER (OUTPATIENT)
Dept: PHYSICAL THERAPY | Age: 48
Discharge: HOME | End: 2023-01-25
Payer: COMMERCIAL

## 2023-01-25 PROCEDURE — 97530 THERAPEUTIC ACTIVITIES: CPT | Performed by: PHYSICAL THERAPIST

## 2023-01-25 PROCEDURE — 97112 NEUROMUSCULAR REEDUCATION: CPT | Performed by: PHYSICAL THERAPIST

## 2023-01-25 NOTE — PROGRESS NOTES
PT DAILY TREATMENT NOTE 2-15    Patient Name: John Sommer  Date:2023  : 1975  [x]  Patient  Verified  Payor: Feliz Morin / Plan: [id number] [place] PPO / Product Type: PPO /    In time: 900  Out time: 945  Total Treatment Time (min): 45  Visit #:  2    Treatment Area: Pelvic floor dysfunction [M62.89]  Constipation [K59.00]    SUBJECTIVE  Pain Level (0-10 scale): 0  Any medication changes, allergies to medications, adverse drug reactions, diagnosis change, or new procedure performed?: [x] No    [] Yes (see summary sheet for update)  Subjective functional status/changes:   [] No changes reported  Patient reports that she is doing much better after making a few diet changes with fiber and magnesium. She gets regular kidney stones, and was able to pass one on her own after starting the [address]. Will follow up with her PCP about this.   Is very aware of how much she clenches    OBJECTIVE        15 min Therapeutic Activity:  [x]  See flow sheet :   Rationale: increase ROM, increase strength, improve coordination, and increase proprioception  to improve the patients ability to reduce pain     30 min Neuromuscular Re-education:  [x]  See flow sheet :   Rationale: increase ROM, increase strength, improve coordination, and increase proprioception  to improve the patients ability to perform ADLs without constipation    With   [] TE   [] TA   [] Neuro   [] SC   [] other: Patient Education: [x] Review HEP    [] Progressed/Changed HEP based on:   [] positioning   [] body mechanics   [] transfers   [] heat/ice application    [] other:      Other Objective/Functional Measures:   External Pelvic Exam  Non tender through external pelvic clock  No POP at rest or with sustained valsalva  Active contraction: present  Active relaxation: NP  Involuntary relaxationNP:     Internal Vaginal Exam:  Layer 1 wnl  Layer 2/3 Inc TTP  Bladder neck mobility:    PERFECT SCORE CHART  P =  Power (Laycock Scale Grade 0-5) 4/5  E =  Endurance (How long pt holds max contraction) 2 sec  R =  Repetitions (How many times the repeats holds) 5x  F =  Fast Twitch (How many 1 second contractions in 10 seconds) unable  E =  Elevation (Lift of post vaginal wall toward pubic bone) present   C =  Coordinated cocontraction of transverse abdominus present  T = Timing (squeeze and lift with cough)   NP    sEMG: rest: 10mV (able to achieve normal after exercises)  MaxL 20mV 2 sec    Pain Level (0-10 scale) post treatment: 0    ASSESSMENT/Changes in Function:     Patient will continue to benefit from skilled PT services to modify and progress therapeutic interventions, address functional mobility deficits, address ROM deficits, address strength deficits, analyze and address soft tissue restrictions, analyze and cue movement patterns, analyze and modify body mechanics/ergonomics, and assess and modify postural abnormalities to attain remaining goals.      []  See Plan of Care  []  See progress note/recertification  []  See Discharge Summary         Progress towards goals / Updated goals:  Demonstrates good potential to meet all goals at this time    PLAN  []  Upgrade activities as tolerated     [x]  Continue plan of care  []  Update interventions per flow sheet       []  Discharge due to:_  []  Other:_      Gila Lewis, PT 1/25/2023

## 2023-02-01 ENCOUNTER — APPOINTMENT (OUTPATIENT)
Dept: PHYSICAL THERAPY | Age: 48
End: 2023-02-01

## 2023-02-17 ENCOUNTER — APPOINTMENT (OUTPATIENT)
Dept: PHYSICAL THERAPY | Age: 48
End: 2023-02-17

## 2023-04-22 DIAGNOSIS — E04.2 MULTINODULAR GOITER: Primary | ICD-10-CM

## 2023-07-17 ENCOUNTER — OFFICE VISIT (OUTPATIENT)
Age: 48
End: 2023-07-17
Payer: COMMERCIAL

## 2023-07-17 VITALS
HEART RATE: 78 BPM | DIASTOLIC BLOOD PRESSURE: 76 MMHG | BODY MASS INDEX: 23.37 KG/M2 | RESPIRATION RATE: 16 BRPM | OXYGEN SATURATION: 99 % | TEMPERATURE: 98.1 F | HEIGHT: 62 IN | WEIGHT: 127 LBS | SYSTOLIC BLOOD PRESSURE: 114 MMHG

## 2023-07-17 DIAGNOSIS — R22.31 LOCALIZED SWELLING, MASS AND LUMP, RIGHT UPPER LIMB: ICD-10-CM

## 2023-07-17 DIAGNOSIS — E04.9 NONTOXIC GOITER, UNSPECIFIED: Primary | ICD-10-CM

## 2023-07-17 PROCEDURE — 99214 OFFICE O/P EST MOD 30 MIN: CPT | Performed by: SURGERY

## 2023-07-17 RX ORDER — VITAMIN E 268 MG
400 CAPSULE ORAL DAILY
COMMUNITY

## 2023-07-17 ASSESSMENT — ENCOUNTER SYMPTOMS
CONSTIPATION: 0
BLOOD IN STOOL: 0
SHORTNESS OF BREATH: 0
SORE THROAT: 0
STRIDOR: 0
EYE PAIN: 0
NAUSEA: 0
BACK PAIN: 0
DIARRHEA: 0
COUGH: 0
VOMITING: 0
WHEEZING: 0
ABDOMINAL PAIN: 0

## 2023-07-17 ASSESSMENT — PATIENT HEALTH QUESTIONNAIRE - PHQ9
SUM OF ALL RESPONSES TO PHQ QUESTIONS 1-9: 0
SUM OF ALL RESPONSES TO PHQ9 QUESTIONS 1 & 2: 0
2. FEELING DOWN, DEPRESSED OR HOPELESS: 0
SUM OF ALL RESPONSES TO PHQ QUESTIONS 1-9: 0
SUM OF ALL RESPONSES TO PHQ QUESTIONS 1-9: 0
1. LITTLE INTEREST OR PLEASURE IN DOING THINGS: 0
SUM OF ALL RESPONSES TO PHQ QUESTIONS 1-9: 0

## 2023-07-17 NOTE — PROGRESS NOTES
Subjective:      Patient ID: Jeremy Mcbride is a 51 y.o. female who comes in for evaluation of a right arm mass and goiter      Chief Complaint   Patient presents with    Follow-up     Follow up for cyst on right arm. HPI   She noted a right forearm mass 10/2021. It was painful initially. Now it is sore at times. She remembers waking up from sleeping with her left elbow pushing into the area (she reports being a restless sleeper). She denies having any bruising in the  area initially. She now notes a couple other small nodules. She denies other trauma, skin changes, pores, ulceration. She denies other similar issues, although her mother has lipomas. She also has a hx goiter. Recently she feels neck pressure and tightness. She denies palpitations, dysphagia, voice changes, unexplained weight loss/gain, hot/cold intolerance. She does report some post nasal drip/congestion. She denies reflux. Past Medical History:   Diagnosis Date    Calculus of kidney     s/p lithotripsy x2    Multinodular non-toxic goiter     Seasonal allergic rhinitis     Vasomotor rhinitis      Past Surgical History:   Procedure Laterality Date    LITHOTRIPSY      x2    ORTHOPEDIC SURGERY Left 1987    knee scope     Family History   Problem Relation Age of Onset    Heart Disease Mother         stents    Thyroid Disease Mother         hypothyroidism    Cancer Paternal Grandmother     Hypertension Father     Heart Disease Father         stent    Stroke Maternal Grandmother     Lung Disease Maternal Grandfather     Diabetes Mother     Depression Mother     Diabetes Paternal Grandmother     Thyroid Disease Paternal Grandmother         goiter    Heart Disease Paternal Grandfather      Social History     Tobacco Use    Smoking status: Never    Smokeless tobacco: Never   Vaping Use    Vaping Use: Never used   Substance Use Topics    Alcohol use:  Yes     Alcohol/week: 0.0 standard drinks    Drug use: No     Current